# Patient Record
Sex: MALE | Race: WHITE | NOT HISPANIC OR LATINO | Employment: FULL TIME | URBAN - METROPOLITAN AREA
[De-identification: names, ages, dates, MRNs, and addresses within clinical notes are randomized per-mention and may not be internally consistent; named-entity substitution may affect disease eponyms.]

---

## 2017-10-09 ENCOUNTER — APPOINTMENT (OUTPATIENT)
Dept: RADIOLOGY | Age: 32
End: 2017-10-09
Payer: OTHER MISCELLANEOUS

## 2017-10-09 ENCOUNTER — APPOINTMENT (OUTPATIENT)
Dept: URGENT CARE | Age: 32
End: 2017-10-09
Payer: OTHER MISCELLANEOUS

## 2017-10-09 ENCOUNTER — TRANSCRIBE ORDERS (OUTPATIENT)
Dept: URGENT CARE | Age: 32
End: 2017-10-09

## 2017-10-09 DIAGNOSIS — S49.91XA INJURY OF RIGHT SHOULDER, INITIAL ENCOUNTER: Primary | ICD-10-CM

## 2017-10-09 DIAGNOSIS — S49.91XA INJURY OF RIGHT SHOULDER, INITIAL ENCOUNTER: ICD-10-CM

## 2017-10-09 PROCEDURE — 99283 EMERGENCY DEPT VISIT LOW MDM: CPT | Performed by: PREVENTIVE MEDICINE

## 2017-10-09 PROCEDURE — 73030 X-RAY EXAM OF SHOULDER: CPT

## 2017-10-09 PROCEDURE — G0382 LEV 3 HOSP TYPE B ED VISIT: HCPCS | Performed by: PREVENTIVE MEDICINE

## 2017-10-16 ENCOUNTER — APPOINTMENT (OUTPATIENT)
Dept: URGENT CARE | Age: 32
End: 2017-10-16
Payer: OTHER MISCELLANEOUS

## 2017-10-16 PROCEDURE — 99212 OFFICE O/P EST SF 10 MIN: CPT | Performed by: PREVENTIVE MEDICINE

## 2017-10-26 ENCOUNTER — APPOINTMENT (OUTPATIENT)
Dept: PHYSICAL THERAPY | Facility: REHABILITATION | Age: 32
End: 2017-10-26
Payer: OTHER MISCELLANEOUS

## 2017-10-26 PROCEDURE — G8984 CARRY CURRENT STATUS: HCPCS

## 2017-10-26 PROCEDURE — G8985 CARRY GOAL STATUS: HCPCS

## 2017-10-26 PROCEDURE — 97161 PT EVAL LOW COMPLEX 20 MIN: CPT

## 2017-10-26 PROCEDURE — 97014 ELECTRIC STIMULATION THERAPY: CPT

## 2017-10-30 ENCOUNTER — APPOINTMENT (OUTPATIENT)
Dept: PHYSICAL THERAPY | Facility: REHABILITATION | Age: 32
End: 2017-10-30
Payer: OTHER MISCELLANEOUS

## 2017-10-30 PROCEDURE — 97140 MANUAL THERAPY 1/> REGIONS: CPT

## 2017-10-30 PROCEDURE — 97112 NEUROMUSCULAR REEDUCATION: CPT

## 2017-10-30 PROCEDURE — 97014 ELECTRIC STIMULATION THERAPY: CPT

## 2017-10-31 ENCOUNTER — APPOINTMENT (OUTPATIENT)
Dept: URGENT CARE | Age: 32
End: 2017-10-31
Payer: OTHER MISCELLANEOUS

## 2017-10-31 PROCEDURE — 99213 OFFICE O/P EST LOW 20 MIN: CPT | Performed by: PREVENTIVE MEDICINE

## 2017-11-02 ENCOUNTER — APPOINTMENT (OUTPATIENT)
Dept: PHYSICAL THERAPY | Facility: REHABILITATION | Age: 32
End: 2017-11-02
Payer: OTHER MISCELLANEOUS

## 2017-11-02 PROCEDURE — 97014 ELECTRIC STIMULATION THERAPY: CPT

## 2017-11-02 PROCEDURE — 97112 NEUROMUSCULAR REEDUCATION: CPT

## 2017-11-06 ENCOUNTER — APPOINTMENT (OUTPATIENT)
Dept: PHYSICAL THERAPY | Facility: REHABILITATION | Age: 32
End: 2017-11-06
Payer: OTHER MISCELLANEOUS

## 2017-11-06 PROCEDURE — 97014 ELECTRIC STIMULATION THERAPY: CPT

## 2017-11-09 ENCOUNTER — APPOINTMENT (OUTPATIENT)
Dept: PHYSICAL THERAPY | Facility: REHABILITATION | Age: 32
End: 2017-11-09
Payer: OTHER MISCELLANEOUS

## 2017-11-09 PROCEDURE — 97014 ELECTRIC STIMULATION THERAPY: CPT

## 2017-11-09 PROCEDURE — 97112 NEUROMUSCULAR REEDUCATION: CPT

## 2017-11-16 ENCOUNTER — APPOINTMENT (OUTPATIENT)
Dept: PHYSICAL THERAPY | Facility: REHABILITATION | Age: 32
End: 2017-11-16
Payer: OTHER MISCELLANEOUS

## 2019-08-26 ENCOUNTER — TELEPHONE (OUTPATIENT)
Dept: UROLOGY | Facility: MEDICAL CENTER | Age: 34
End: 2019-08-26

## 2019-08-27 ENCOUNTER — TELEPHONE (OUTPATIENT)
Dept: UROLOGY | Facility: CLINIC | Age: 34
End: 2019-08-27

## 2019-08-27 NOTE — TELEPHONE ENCOUNTER
L/m regarding r/s appointment, new location, doctor and date/time  Patient scheduled in error with cameron for vas consult

## 2019-09-16 ENCOUNTER — OFFICE VISIT (OUTPATIENT)
Dept: UROLOGY | Facility: AMBULATORY SURGERY CENTER | Age: 34
End: 2019-09-16
Payer: COMMERCIAL

## 2019-09-16 VITALS
HEART RATE: 64 BPM | SYSTOLIC BLOOD PRESSURE: 136 MMHG | WEIGHT: 209 LBS | HEIGHT: 69 IN | DIASTOLIC BLOOD PRESSURE: 90 MMHG | BODY MASS INDEX: 30.96 KG/M2

## 2019-09-16 DIAGNOSIS — Z30.2 ENCOUNTER FOR STERILIZATION: Primary | ICD-10-CM

## 2019-09-16 PROCEDURE — 99244 OFF/OP CNSLTJ NEW/EST MOD 40: CPT | Performed by: UROLOGY

## 2019-09-16 RX ORDER — ATENOLOL 25 MG/1
25 TABLET ORAL DAILY
COMMUNITY

## 2019-09-16 RX ORDER — LORAZEPAM 2 MG/1
2 TABLET ORAL
Qty: 1 TABLET | Refills: 0 | Status: SHIPPED | OUTPATIENT
Start: 2019-09-16

## 2019-09-16 RX ORDER — LISINOPRIL 10 MG/1
10 TABLET ORAL DAILY
COMMUNITY

## 2019-09-16 NOTE — PROGRESS NOTES
9/16/2019    Frida Serna  1985  [id number]        Assessment  Elective sterilization    Plan  We discussed our vasectomy packet in detail  He understands the indications, risks, and benefits of the procedure  He understands that this is generally considered a permanent procedure although there is a method for reversal, it is not guaranteed to work and would not be covered by insurance  He understands that he would not be considered sterile until negative semen analysis is obtained post procedure  He further understands that he must rest, ice, and elevate the scrotum for at least 48 hours to avoid complications such as hematoma  Consent was obtained in the office today  All of his questions were answered  He was given a prescription for Ativan to be taken prior to the visit  History of Present Illness  Serina Olivares is a 30 y o  male with 1 child and 3 stepchildren requesting elective sterilization  He works as a    He understands that vasectomy is a permanent form of birth control  Review of Systems  Review of Systems   Constitutional: Negative  HENT: Negative  Respiratory: Negative  Cardiovascular: Negative  Gastrointestinal: Negative  Genitourinary:        As per HPI   Musculoskeletal: Negative  Skin: Negative  Neurological: Negative  Hematological: Negative            Past Medical History  Past Medical History:   Diagnosis Date    Hypertension        Past Social History  Past Surgical History:   Procedure Laterality Date    TONSILLECTOMY         Past Family History  Family History   Problem Relation Age of Onset    Heart attack Father     Kidney disease Mother        Past Social history  Social History     Socioeconomic History    Marital status: Single     Spouse name: Not on file    Number of children: Not on file    Years of education: Not on file    Highest education level: Not on file   Occupational History    Not on file   Social Needs    Financial resource strain: Not on file    Food insecurity:     Worry: Not on file     Inability: Not on file    Transportation needs:     Medical: Not on file     Non-medical: Not on file   Tobacco Use    Smoking status: Current Every Day Smoker     Types: Cigarettes    Smokeless tobacco: Never Used    Tobacco comment: smoked 3 months 4 years ago   Substance and Sexual Activity    Alcohol use: Yes     Frequency: Monthly or less    Drug use: No     Comment: Quit 4 years ago    Sexual activity: Not on file   Lifestyle    Physical activity:     Days per week: Not on file     Minutes per session: Not on file    Stress: Not on file   Relationships    Social connections:     Talks on phone: Not on file     Gets together: Not on file     Attends [employer] service: Not on file     Active member of club or organization: Not on file     Attends meetings of clubs or organizations: Not on file     Relationship status: Not on file    Intimate partner violence:     Fear of current or ex partner: Not on file     Emotionally abused: Not on file     Physically abused: Not on file     Forced sexual activity: Not on file   Other Topics Concern    Not on file   Social History Narrative    Not on file     Social History     Tobacco Use   Smoking Status Current Every Day Smoker    Types: Cigarettes   Smokeless Tobacco Never Used   Tobacco Comment    smoked 3 months 4 years ago       Current Medications  Current Outpatient Medications   Medication Sig Dispense Refill    atenolol (TENORMIN) 25 mg tablet Take 25 mg by mouth daily      lisinopril (ZESTRIL) 10 mg tablet Take 10 mg by mouth daily      LORazepam (ATIVAN) 2 mg tablet Take 1 tablet (2 mg total) by mouth 60 minutes pre-procedure 1 tablet 0     No current facility-administered medications for this visit  Allergies  No Known Allergies    Past Medical History, Social History, Family History, medications and allergies were reviewed      Vitals  Vitals: 09/16/19 1116   BP: 136/90   BP Location: Left arm   Patient Position: Sitting   Cuff Size: Standard   Pulse: 64   Weight: 94 8 kg (209 lb)   Height: 5' 9" (1 753 m)       Physical Exam  Physical Exam   Constitutional: He is oriented to person, place, and time  He appears well-developed and well-nourished  Cardiovascular: Normal rate  Pulmonary/Chest: Effort normal    Abdominal: Soft  Genitourinary:   Genitourinary Comments: Normal Vasa and testes  Musculoskeletal: Normal range of motion  Neurological: He is alert and oriented to person, place, and time  Skin: Skin is warm, dry and intact  Psychiatric: He has a normal mood and affect  Vitals reviewed          Results  No results found for: PSA  No results found for: GLUCOSE, CALCIUM, NA, K, CO2, CL, BUN, CREATININE  No results found for: WBC, HGB, HCT, MCV, PLT

## 2019-11-19 RX ORDER — MONTELUKAST SODIUM 10 MG/1
10 TABLET ORAL
COMMUNITY
Start: 2019-11-11

## 2019-11-22 ENCOUNTER — PROCEDURE VISIT (OUTPATIENT)
Dept: UROLOGY | Facility: AMBULATORY SURGERY CENTER | Age: 34
End: 2019-11-22
Payer: COMMERCIAL

## 2019-11-22 VITALS
BODY MASS INDEX: 30.81 KG/M2 | SYSTOLIC BLOOD PRESSURE: 122 MMHG | HEART RATE: 90 BPM | DIASTOLIC BLOOD PRESSURE: 80 MMHG | HEIGHT: 69 IN | WEIGHT: 208 LBS

## 2019-11-22 DIAGNOSIS — Z30.2 ENCOUNTER FOR STERILIZATION: Primary | ICD-10-CM

## 2019-11-22 PROCEDURE — 55250 REMOVAL OF SPERM DUCT(S): CPT | Performed by: UROLOGY

## 2019-11-22 PROCEDURE — 88302 TISSUE EXAM BY PATHOLOGIST: CPT | Performed by: PATHOLOGY

## 2019-11-22 NOTE — PROGRESS NOTES
Procedure: Vasectomy   Risks, benefits and alternatives were discussed with the patient  We discussed possible complications, including infection and bleeding  The patient was premedicated with lorazepam    The genitalia were prepped with Betadine  Sterile drape was placed  The scrotum was anesthetized with of lidocaine 2%  The skin was opened with the sharp clamp and the right vas was grasped with the ring clamp  The perivasal sheath and vessels were then dissected off bluntly and the vas was doubly clamped  A portion was excised and both ends were then fulgurated and tied with 0 silk  Seeing good hemostasis, they were returned to the scrotum and the skin was closed with a chromic stich  The skin was opened with the sharp clamp and the left vas was grasped with the ring clamp  The perivasal sheath and vessels were then dissected off bluntly and the vas was doubly clamped  A portion was excised and both ends were then fulgurated and tied with 0 silk  Seeing good hemostasis, they were returned to the scrotum and the skin was closed with a chromic stich  Antibiotic ointment was applied  The patient tolerated the procedure well  there were no complications  He was given a prescription for Norco postprocedure  He understands he is not to be considered sterile until 2 consecutive negative semen analyses are obtained postprocedure  He also understands he should rest, ice and elevate the scrotum for atleast 48 hours to avoid complication such as hematoma

## 2019-12-12 ENCOUNTER — TELEPHONE (OUTPATIENT)
Dept: UROLOGY | Facility: MEDICAL CENTER | Age: 34
End: 2019-12-12

## 2019-12-12 NOTE — TELEPHONE ENCOUNTER
Pt managed by Alonzo Vidales called regarding his 2 week post vas appointment which has been bumped to 12/13/19 which he can not do as he works a third shift and has children to get ready for school,he is only available Tues & Thurs early morning before 9am,I was unable to accommodate

## 2019-12-12 NOTE — TELEPHONE ENCOUNTER
Called patient and rescheduled to Tuesday 12/17 1:45pm, he said that he was able to accommodate an early afternoon appointment prior to the start of his shift and was happy with this date and time  Email confirmation sent to [email or web address] at his request

## 2019-12-16 NOTE — PROGRESS NOTES
12/17/2019  Jazmine Vega is a 30 y o  male    Assessment/Plan  Baldev Escobar was seen today for s/p vastectomy  Diagnoses and all orders for this visit:    Status post vasectomy  -     Semen analysis, post-vasectomy; Future  -     Semen analysis, post-vasectomy; Future        Discussion  Jazmine Vega is a 30 y o  male being managed by Dr Anirudh Vasquez  The patient is doing well with no complaints  He was provided verbal and written instructions regarding semen analysis testing  He was instructed to use contraception until sterility confirmed with 2 semen analysis  He will call to review results  He will follow up on an as needed basis  All questions were answered  History of Present Illness  30 y o  male s/p vasectomy (11/22/19), presents today for follow up  He denies any scrotal pain or swelling  He is doing well with no issues after surgery  Vitals  Vitals:    12/17/19 1345   BP: 120/90   Pulse: 88   Weight: 96 1 kg (211 lb 12 8 oz)   Height: 5' 10" (1 778 m)       Current Medications  Current Outpatient Medications   Medication Sig Dispense Refill    atenolol (TENORMIN) 25 mg tablet Take 25 mg by mouth daily      lisinopril (ZESTRIL) 10 mg tablet Take 10 mg by mouth daily      montelukast (SINGULAIR) 10 mg tablet Take 10 mg by mouth      LORazepam (ATIVAN) 2 mg tablet Take 1 tablet (2 mg total) by mouth 60 minutes pre-procedure 1 tablet 0     No current facility-administered medications for this visit  Review of Systems  Patient denies any gross hematuria, dysuria, or difficulty urinating      Physical Exam  Gu: scrotum midline/bilateral incisions c/d/i

## 2019-12-17 ENCOUNTER — OFFICE VISIT (OUTPATIENT)
Dept: UROLOGY | Facility: AMBULATORY SURGERY CENTER | Age: 34
End: 2019-12-17

## 2019-12-17 VITALS
SYSTOLIC BLOOD PRESSURE: 120 MMHG | HEIGHT: 70 IN | BODY MASS INDEX: 30.32 KG/M2 | DIASTOLIC BLOOD PRESSURE: 90 MMHG | HEART RATE: 88 BPM | WEIGHT: 211.8 LBS

## 2019-12-17 DIAGNOSIS — Z98.52 STATUS POST VASECTOMY: Primary | ICD-10-CM

## 2019-12-17 PROCEDURE — 99024 POSTOP FOLLOW-UP VISIT: CPT | Performed by: NURSE PRACTITIONER

## 2020-07-31 ENCOUNTER — TELEPHONE (OUTPATIENT)
Dept: UROLOGY | Facility: AMBULATORY SURGERY CENTER | Age: 35
End: 2020-07-31

## 2020-08-19 ENCOUNTER — APPOINTMENT (OUTPATIENT)
Dept: RADIOLOGY | Age: 35
End: 2020-08-19

## 2020-08-19 ENCOUNTER — APPOINTMENT (OUTPATIENT)
Dept: LAB | Age: 35
End: 2020-08-19

## 2020-08-19 ENCOUNTER — TRANSCRIBE ORDERS (OUTPATIENT)
Dept: ADMINISTRATIVE | Age: 35
End: 2020-08-19

## 2020-08-19 DIAGNOSIS — Z02.1 ENCOUNTER FOR PRE-EMPLOYMENT HEALTH SCREENING EXAMINATION: Primary | ICD-10-CM

## 2020-08-19 DIAGNOSIS — Z02.1 ENCOUNTER FOR PRE-EMPLOYMENT HEALTH SCREENING EXAMINATION: ICD-10-CM

## 2020-08-19 PROCEDURE — 71045 X-RAY EXAM CHEST 1 VIEW: CPT

## 2021-03-10 DIAGNOSIS — Z23 ENCOUNTER FOR IMMUNIZATION: ICD-10-CM

## 2021-06-18 NOTE — PROGRESS NOTES
6/21/2021    Marcinmagdalena Johnson  1985  [id number]      Assessment  -History of vasectomy (11/2019)    Discussion/Plan  Zenaida Elias is a 37 y o  male being managed by Dr Windy Hauser      1  History of testicular pain-   No significant findings noted on physical examination today, and patient currently asymptomatic  We reviewed supportive measures with use of scrotal support, warm compress, and OTC NSAIDs  Cause likely musculskeletal as discomfort is correlated with heavy lifting  Order for scrotal ultrasound was placed should he develop an episode of acute onset testicular pain  Will call with results  Patient will otherwise follow up with our office on as-needed basis  He was instructed to call with any issues     -All questions answered, patient agrees with plan      History of Present Illness  37 y o  male with a history of vasectomy and swollen testicle presents today for follow up  Patient last seen in the office in 2019  He underwent elective sterilization in November 2019  Patient reports history of epididymitis 1 year ago  He states he was prescribed antibiotic by his PCP  Since then, he has had intermittent right-sided testicular discomfort  Most recent episode occurred in May  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  He is currently asymptomatic denies any testicular pain or swelling  He denies any lower urinary tract symptoms, gross hematuria, or dysuria  Patient denies any family history of prostate or testicular malignancy  Review of Systems  Review of Systems   Constitutional: Negative  HENT: Negative  Respiratory: Negative  Cardiovascular: Negative  Gastrointestinal: Negative  Genitourinary: Negative for decreased urine volume, difficulty urinating, discharge, dysuria, flank pain, frequency, hematuria, penile pain, penile swelling, scrotal swelling, testicular pain and urgency  Musculoskeletal: Negative  Skin: Negative  Neurological: Negative  Psychiatric/Behavioral: Negative  Past Medical History  Past Medical History:   Diagnosis Date    Hypertension        Past Social History  Past Surgical History:   Procedure Laterality Date    TONSILLECTOMY         Past Family History  Family History   Problem Relation Age of Onset    Heart attack Father     Kidney disease Mother        Past Social history  Social History     Socioeconomic History    Marital status: Single     Spouse name: Not on file    Number of children: Not on file    Years of education: Not on file    Highest education level: Not on file   Occupational History    Not on file   Tobacco Use    Smoking status: Former Smoker     Types: Cigarettes     Quit date:      Years since quittin 4    Smokeless tobacco: Never Used    Tobacco comment: smoked 3 months 4 years ago   Substance and Sexual Activity    Alcohol use: Yes    Drug use: No     Comment: Quit 4 years ago    Sexual activity: Not on file   Other Topics Concern    Not on file   Social History Narrative    Not on file     Social Determinants of Health     Financial Resource Strain:     Difficulty of Paying Living Expenses:    Food Insecurity:     Worried About Running Out of Food in the Last Year:     [address] in the Last Year:    Transportation Needs:     Lack of Transportation (Medical):      Lack of Transportation (Non-Medical):    Physical Activity:     Days of Exercise per Week:     Minutes of Exercise per Session:    Stress:     Feeling of Stress :    Social Connections:     Frequency of Communication with Friends and Family:     Frequency of Social Gatherings with Friends and Family:     Attends [employer] Services:     Active Member of Clubs or Organizations:     Attends Club or Organization Meetings:     Marital Status:    Intimate Partner Violence:     Fear of Current or Ex-Partner:     Emotionally Abused:     Physically Abused:     Sexually Abused: Current Medications  Current Outpatient Medications   Medication Sig Dispense Refill    atenolol (TENORMIN) 25 mg tablet Take 25 mg by mouth daily      lisinopril (ZESTRIL) 10 mg tablet Take 10 mg by mouth daily      LORazepam (ATIVAN) 2 mg tablet Take 1 tablet (2 mg total) by mouth 60 minutes pre-procedure 1 tablet 0    montelukast (SINGULAIR) 10 mg tablet Take 10 mg by mouth       No current facility-administered medications for this visit  Allergies  No Known Allergies    Past Medical History, Social History, Family History, medications and allergies were reviewed  Vitals  Vitals:    06/21/21 1134   BP: 142/88   BP Location: Left arm   Patient Position: Sitting   Cuff Size: Adult   Pulse: 91   Weight: 90 7 kg (200 lb)   Height: 5' 9" (1 753 m)       Physical Exam  Physical Exam  Constitutional:       Appearance: Normal appearance  He is well-developed  HENT:      Head: Normocephalic  Eyes:      Pupils: Pupils are equal, round, and reactive to light  Pulmonary:      Effort: Pulmonary effort is normal    Abdominal:      Palpations: Abdomen is soft  Genitourinary:     Penis: Normal        Testes: Normal       Comments: Testicles descended bilaterally, no erythema, edema, or tenderness  Penis circumcised, no inguinal hernia noted  Musculoskeletal:         General: Normal range of motion  Cervical back: Normal range of motion  Skin:     General: Skin is warm and dry  Neurological:      General: No focal deficit present  Mental Status: He is alert and oriented to person, place, and time  Psychiatric:         Mood and Affect: Mood normal          Behavior: Behavior normal          Thought Content:  Thought content normal          Judgment: Judgment normal          Results    I have personally reviewed all pertinent lab results and reviewed with patient  No results found for: PSA  No results found for: GLUCOSE, CALCIUM, NA, K, CO2, CL, BUN, CREATININE  No results found for: WBC, HGB, HCT, MCV, PLT  No results found for this or any previous visit (from the past 1 hour(s))

## 2021-06-21 ENCOUNTER — OFFICE VISIT (OUTPATIENT)
Dept: UROLOGY | Facility: AMBULATORY SURGERY CENTER | Age: 36
End: 2021-06-21
Payer: COMMERCIAL

## 2021-06-21 VITALS
DIASTOLIC BLOOD PRESSURE: 88 MMHG | HEART RATE: 91 BPM | BODY MASS INDEX: 29.62 KG/M2 | HEIGHT: 69 IN | WEIGHT: 200 LBS | SYSTOLIC BLOOD PRESSURE: 142 MMHG

## 2021-06-21 DIAGNOSIS — Z98.52 HISTORY OF VASECTOMY: ICD-10-CM

## 2021-06-21 DIAGNOSIS — N50.811 PAIN IN RIGHT TESTICLE: Primary | ICD-10-CM

## 2021-06-21 PROCEDURE — 99213 OFFICE O/P EST LOW 20 MIN: CPT | Performed by: NURSE PRACTITIONER

## 2022-10-24 ENCOUNTER — HOSPITAL ENCOUNTER (OUTPATIENT)
Dept: RADIOLOGY | Facility: IMAGING CENTER | Age: 37
Discharge: HOME | End: 2022-10-24
Payer: COMMERCIAL

## 2022-10-24 DIAGNOSIS — N45.1 EPIDIDYMITIS WITHOUT ABSCESS: ICD-10-CM

## 2022-10-24 PROCEDURE — 76870 US EXAM SCROTUM: CPT

## 2024-10-25 ENCOUNTER — TRANSCRIBE ORDERS (OUTPATIENT)
Dept: SLEEP CENTER | Facility: CLINIC | Age: 39
End: 2024-10-25

## 2024-10-25 DIAGNOSIS — G47.33 OSA (OBSTRUCTIVE SLEEP APNEA): Primary | ICD-10-CM

## 2024-12-13 ENCOUNTER — HOSPITAL ENCOUNTER (OUTPATIENT)
Dept: SLEEP CENTER | Facility: HOSPITAL | Age: 39
Discharge: HOME | End: 2024-12-13
Payer: COMMERCIAL

## 2024-12-13 DIAGNOSIS — G47.33 OSA (OBSTRUCTIVE SLEEP APNEA): ICD-10-CM

## 2024-12-13 PROCEDURE — G0399 HOME SLEEP TEST/TYPE 3 PORTA: HCPCS

## 2024-12-13 NOTE — PROGRESS NOTES
Home Sleep Study Documentation    HOME STUDY DEVICE: Noxturnal no                                           Jada G3 yes device # 28      Pre-Sleep Home Study:    Set-up and instructions performed by: Amanda    Technician performed demonstration for Patient: yes    Return demonstration performed by Patient: yes    Written instructions provided to Patient: yes    Patient signed consent form: yes        Post-Sleep Home Study:    Additional comments by Patient: None    Home Sleep Study Failed:no:    Failure reason: N/A    Reported or Detected: N/A    Scored by: DEVANTE Egan

## 2024-12-20 PROBLEM — G47.33 OSA (OBSTRUCTIVE SLEEP APNEA): Status: ACTIVE | Noted: 2024-12-20

## 2024-12-27 PROCEDURE — 95806 SLEEP STUDY UNATT&RESP EFFT: CPT | Performed by: INTERNAL MEDICINE

## 2025-01-03 ENCOUNTER — TELEPHONE (OUTPATIENT)
Dept: SLEEP CENTER | Facility: CLINIC | Age: 40
End: 2025-01-03

## 2025-01-03 NOTE — TELEPHONE ENCOUNTER
Home sleep study resulted and shows severe sleep apnea, CARLOS 30.3  Recommendations per Dr. Rodriguez:  Limitations of home sleep testing compromises accuracy. Based on the results of this study, positive airway pressure therapy using auto titrating PAP is recommended. Clinical correlation is advised.     Patient read results.     Call to patient reviewed results with patient and recommendations.     Patient states he was in to see Dr. Valdez, no note found.  States he put in order for CPAP titration study.  Not found in chart. Patient instructed the referral for the study might not have been transcribed into an order yet. Patient instructed if he does not get a [employer] message by Tuesday 1/7/25 to contact Dr. Liao office.     Patient scheduled for consult with TAMARA Bonilla on 3/21/2025 in the [place] office.

## 2025-02-13 ENCOUNTER — OFFICE VISIT (OUTPATIENT)
Dept: SLEEP CENTER | Facility: CLINIC | Age: 40
End: 2025-02-13
Payer: COMMERCIAL

## 2025-02-13 VITALS
BODY MASS INDEX: 31.55 KG/M2 | DIASTOLIC BLOOD PRESSURE: 80 MMHG | HEART RATE: 91 BPM | OXYGEN SATURATION: 98 % | HEIGHT: 69 IN | RESPIRATION RATE: 16 BRPM | WEIGHT: 213 LBS | SYSTOLIC BLOOD PRESSURE: 110 MMHG

## 2025-02-13 DIAGNOSIS — F31.9 BIPOLAR 1 DISORDER (HCC): ICD-10-CM

## 2025-02-13 DIAGNOSIS — E66.09 CLASS 1 OBESITY DUE TO EXCESS CALORIES WITH BODY MASS INDEX (BMI) OF 31.0 TO 31.9 IN ADULT, UNSPECIFIED WHETHER SERIOUS COMORBIDITY PRESENT: ICD-10-CM

## 2025-02-13 DIAGNOSIS — G47.33 OSA (OBSTRUCTIVE SLEEP APNEA): Primary | ICD-10-CM

## 2025-02-13 DIAGNOSIS — E66.811 CLASS 1 OBESITY DUE TO EXCESS CALORIES WITH BODY MASS INDEX (BMI) OF 31.0 TO 31.9 IN ADULT, UNSPECIFIED WHETHER SERIOUS COMORBIDITY PRESENT: ICD-10-CM

## 2025-02-13 DIAGNOSIS — I10 HYPERTENSION, ESSENTIAL: ICD-10-CM

## 2025-02-13 PROCEDURE — 99245 OFF/OP CONSLTJ NEW/EST HI 55: CPT | Performed by: NURSE PRACTITIONER

## 2025-02-13 RX ORDER — DEXTROMETHORPHAN HYDROBROMIDE, BUPROPION HYDROCHLORIDE 105; 45 MG/1; MG/1
1 TABLET, MULTILAYER, EXTENDED RELEASE ORAL 2 TIMES DAILY
COMMUNITY

## 2025-02-13 RX ORDER — ROSUVASTATIN CALCIUM 10 MG/1
10 TABLET, COATED ORAL DAILY
COMMUNITY
Start: 2024-11-20 | End: 2025-11-20

## 2025-02-13 RX ORDER — ATOMOXETINE 80 MG/1
80 CAPSULE ORAL
COMMUNITY
Start: 2024-09-12

## 2025-02-13 RX ORDER — CARIPRAZINE 4.5 MG/1
1 CAPSULE, GELATIN COATED ORAL DAILY
COMMUNITY
Start: 2025-01-08

## 2025-02-13 RX ORDER — TADALAFIL 5 MG/1
TABLET ORAL
COMMUNITY
Start: 2025-02-11

## 2025-02-13 RX ORDER — NALTREXONE HYDROCHLORIDE 50 MG/1
TABLET, FILM COATED ORAL
COMMUNITY
Start: 2025-02-11

## 2025-02-13 RX ORDER — LAMOTRIGINE 100 MG/1
TABLET ORAL
COMMUNITY
Start: 2025-02-11

## 2025-02-13 RX ORDER — TRAZODONE HYDROCHLORIDE 50 MG/1
TABLET, FILM COATED ORAL
COMMUNITY
Start: 2025-02-11

## 2025-02-13 RX ORDER — LISINOPRIL 20 MG/1
1 TABLET ORAL DAILY
COMMUNITY
Start: 2025-01-17

## 2025-02-13 RX ORDER — OMEPRAZOLE 40 MG/1
40 CAPSULE, DELAYED RELEASE ORAL DAILY
COMMUNITY
Start: 2024-09-24 | End: 2025-09-24

## 2025-02-13 NOTE — ASSESSMENT & PLAN NOTE
We reviewed the association between sleep, mood, and coexisting psychiatric disorders. We discussed the importance of obtaining adequate sleep of at least 7 hours nightly. Patient was encouraged to continue current prescribed medications and to continue follow up with their PCP/psychiatrist for further management.     Orders:    CPAP Auto New DME

## 2025-02-13 NOTE — ASSESSMENT & PLAN NOTE
Hypertension - We reviewed the association between untreated obstructive sleep apnea and the increased risk for hypertension. Patient to continue on prescribed anti-hypertensive therapy (tenormin, lisinopril) and follow up with PCP for continuity of care.     Orders:    CPAP Auto New DME

## 2025-02-13 NOTE — ASSESSMENT & PLAN NOTE
I reviewed the recent test results with the patient.  We talked about the abnormal findings, including those consistent with Obstructive Sleep Apnea. We then discussed how the these are categorized as mild, moderate or severe.  We also covered the medical comorbidities associated with untreated Obstructive Sleep Apnea including, hypertension, cardiac arrythmia, myocardial infarction and stroke.  I explained how the risk of these conditions increases with the severity of the test results.  I also spoke in some detail about the most common treatment options including weight loss, nasal PAP, mandibular advancement devices and uvulopalatopharyngoplasty (UPPP).  I answered all questions posed to me and gave my opinion regarding the best options for treatment based on the patient and their level of disease.  In this case he chose to begin treatment using CPAP.    The patient will return 31-91 days after beginning use of PAP therapy for a review of compliance data collected after beginning treatment.  We will discuss this data and talk about any problems that may prevent successful treatment of Obstructive Sleep Apnea.  Changes will be made in treatment parameters or interface devices in order to improve his ability to continue using PAP to maintain upper airway patency during sleep.  Orders:    CPAP Auto New DME

## 2025-02-13 NOTE — PATIENT INSTRUCTIONS
Patient Instructions:    1. Schedule an appointment for set up of PAP equipment  2.  Begin using your equipment every night  3.  Begin cleaning your equipment, as recommended   4.  Contact your medical equipment distributor regarding any questions concerning your PAP equipment  5.  Contact the [facility] with any other questions, prior to next visit, if needed  6.  Schedule compliance follow-up visit in 31-91 days, as required by insurance     Thank you for trusting me with your care!    I know we often  cover a lot of information at the visit, so if you have follow-up questions, are unclear about the plan, or feel there were important items that we did not discuss or you did not receive clarity on, please don't hesitate to reach out to me.     [employer] messages are preferred for routine matters.  Please make sure to call for urgent matters as there can be a delay in responding to questions over [employer].    IMPORTANT- Prior to a sleep study (at home or in the sleep lab), I strongly recommend contacting your medical insurance first to understand your benefits (including deductible if applicable), coverage for this test, and out of pocket costs.  Even if the test is approved by medical insurance, the cost to you is determined by your medical benefits.   If you have concerns about your out of pocket costs for sleep testing, please contact me/the office before you complete the test and we can discuss if there are alternate options.     I recommend following this advice in general before any lab test, imaging test, doctor visit, surgery, or ordering CPAP supplies as it is best to understand your coverage to avoid unexpected bills after the fact.       Nursing Support:  When: Monday through Friday 7:30A-4:30PM except holidays  Where: Our direct line is [phone]  *3  *1.      If you are having a true emergency please call 911.  In the event that the line is busy or it is after hours please leave a voice  message and we will return your call.  Please speak clearly, leaving your full name, birth date, best number to reach you and the reason for your call.   Medication refills: We will need the name of the medication, the dosage, the ordering provider, whether you get a 30 or 90 day refill, and the pharmacy name and address.  Medications will be ordered by the provider only.  Nurses cannot call in prescriptions.  Please allow 7 days for medication refills.  Physician requested updates: If your provider requested that you call with an update after starting medication, please be ready to provide us the medication and dosage, what time you take your medication, the time you attempt to fall asleep, time you fall asleep, when you wake up, and what time you get out of bed.  Sleep Study Results: We will contact you with sleep study results and/or next steps after the physician has reviewed your testing.

## 2025-02-13 NOTE — PROGRESS NOTES
Name: Josh Mars      : 1985      MRN: [id number]  Encounter Provider: TAMARA Bonilla  Encounter Date: 2025   Encounter department: [facility] [place]  :  Assessment & Plan  MILENA (obstructive sleep apnea)  I reviewed the recent test results with the patient.  We talked about the abnormal findings, including those consistent with Obstructive Sleep Apnea. We then discussed how the these are categorized as mild, moderate or severe.  We also covered the medical comorbidities associated with untreated Obstructive Sleep Apnea including, hypertension, cardiac arrythmia, myocardial infarction and stroke.  I explained how the risk of these conditions increases with the severity of the test results.  I also spoke in some detail about the most common treatment options including weight loss, nasal PAP, mandibular advancement devices and uvulopalatopharyngoplasty (UPPP).  I answered all questions posed to me and gave my opinion regarding the best options for treatment based on the patient and their level of disease.  In this case he chose to begin treatment using CPAP.    The patient will return 31-91 days after beginning use of PAP therapy for a review of compliance data collected after beginning treatment.  We will discuss this data and talk about any problems that may prevent successful treatment of Obstructive Sleep Apnea.  Changes will be made in treatment parameters or interface devices in order to improve his ability to continue using PAP to maintain upper airway patency during sleep.  Orders:    CPAP Auto New DME    Hypertension, essential  Hypertension - We reviewed the association between untreated obstructive sleep apnea and the increased risk for hypertension. Patient to continue on prescribed anti-hypertensive therapy (tenormin, lisinopril) and follow up with PCP for continuity of care.     Orders:    CPAP Auto New DME    Bipolar 1 disorder (HCC)  We reviewed the association  between sleep, mood, and coexisting psychiatric disorders. We discussed the importance of obtaining adequate sleep of at least 7 hours nightly. Patient was encouraged to continue current prescribed medications and to continue follow up with their PCP/psychiatrist for further management.     Orders:    CPAP Auto New DME    Class 1 obesity due to excess calories with body mass index (BMI) of 31.0 to 31.9 in adult, unspecified whether serious comorbidity present  Obesity - We reviewed the association of obesity on obstructive sleep apnea and sleep disordered breathing. Encouraged patient to follow healthy diet, regular exercise regimen, and pursue weight loss to manage symptoms.            History of Present Illness   Patient is a 39 year old male with PMHx of obesity, HTN, bipolar disorder, history of substance abuse, who presents for a consultation to establish care for severe obstructive sleep apnea.  His wife reports loud snoring, gasping and witnessed periods of apnea.  He discussed excessive daytime sleepiness with his PCP and a home sleep study was ordered and completed in December 2024.  During the study, he had a total of 267 respiratory events made up of 194 obstructive apneas, 0 central apneas, 1 mixed apneas and 72 hypopneas resulting in a respiratory event index (CARLOS) of 30.3/h.  The index was higher while supine.  The lowest SpO2 recorded is 85% and 11.4 minutes during the study was spent with saturations <90%.          Sitting and reading: Moderate chance of dozing  Watching TV: High chance of dozing  Sitting, inactive in a public place (e.g. a theatre or a meeting): Slight chance of dozing  As a passenger in a car for an hour without a break: High chance of dozing  Lying down to rest in the afternoon when circumstances permit: High chance of dozing  Sitting and talking to someone: Would never doze  Sitting quietly after a lunch without alcohol: High chance of dozing  In a car, while stopped for a few  "minutes in traffic: Would never doze  Total score: 15     Review of Systems  Pertinent positives/negatives included in HPI and also as noted: recent neck surgery with artificial disc insertion      Prior Sleep Studies:  He completed a home sleep study in December 2024.  CARLOS was 30.3. Oxygen jose luis was  85% with 11.4 minutes spent at saturations less than 90%.        Past Treatments:  none    Sleep-Wake Schedule:  He is self-described as a night person.  Bedtime: 10:00pm  Wake Time: 4:30am  Difficulty Falling Asleep: No  Avg Number of Awakenings: 3 times per night  Cause of Awakenings: dreams - vivid \"crazy\" dreams, occasionally wakes up for urination (took cialis for prostate enlargement and frequent nocturia)  Weekend Sleep Schedule: goes to bed around 11:30pm and wakes up around 11:30am  Naps: almost daily, 1-2 hours after work, before dinner    If He does take a nap, naps are not refreshing in quality    Avg TST per 24 hours: 5-6 hours and up to 10 on weekends    Sleep-Related Details:  Preferred Sleep Position: side(s)  SDB Symptoms: snoring, witnessed apneas, gasping/choking, multiple awakenings, dry mouth/nose, and waking unrefreshed  Bruxism: Yes, does not use an oral appliance  Nocturnal GERD: Yes, frequently - recently started  Nocturnal Palpitations: Yes, occasionally when going to sleep  Nocturnal Anxiety or Rumination: Yes, sometimes has anxiety keeping him from sleep - situational  Sleep-Related Hallucinations: Yes, sees intruder or figure and has sleep paralysis    Sleep Paralysis: Yes, began 10 years ago, occurs when sleep routine is disrupted    Cataplexy: No   He has intermittent symptoms consistent with restless legs syndrome.    He talks in his sleep at times.  No sleep walking.    Wake-Related Details  Daytime Sleepiness: Yes, [place] 15/24    Drowsy Driving: No  Employment:  currently working full time.  He works M-F between the hours of 6:00-2:00pm.    CDL license:  No    Caffeine:  one energy " "drink daily in the am, no other caffeine    Tobacco:   reports that he quit smoking about 10 years ago. His smoking use included cigarettes. He has never used smokeless tobacco.  E-cig/Vaping:    E-Cigarette/Vaping      E-Cigarette/Vaping Substances      Alcohol:   reports current alcohol use. 1-2 beers per week   Drugs:   reports no history of drug use. - past history of use of heroin, cocaine, meth, acid daily with last use in 2016      Weight Change:   weight has been relatively stable, 25 lb weight gain with medication    He does have difficulty with memory or concentration.         Family History of Sleep Disorders: father uses CPAP        Current Outpatient Medications on File Prior to Visit   Medication Sig Dispense Refill    atenolol (TENORMIN) 25 mg tablet Take 25 mg by mouth daily      atomoxetine (STRATTERA) 80 MG capsule Take 80 mg by mouth      lamoTRIgine (LaMICtal) 100 mg tablet       lisinopril (ZESTRIL) 20 mg tablet Take 1 tablet by mouth in the morning      montelukast (SINGULAIR) 10 mg tablet Take 10 mg by mouth      naltrexone (REVIA) 50 mg tablet       omeprazole (PriLOSEC) 40 MG capsule Take 40 mg by mouth daily      rosuvastatin (CRESTOR) 10 MG tablet Take 10 mg by mouth daily      tadalafil (CIALIS) 5 MG tablet       traZODone (DESYREL) 50 mg tablet       Vraylar 4.5 MG capsule Take 1 capsule by mouth daily      [DISCONTINUED] lisinopril (ZESTRIL) 10 mg tablet Take 10 mg by mouth daily      [DISCONTINUED] LORazepam (ATIVAN) 2 mg tablet Take 1 tablet (2 mg total) by mouth 60 minutes pre-procedure 1 tablet 0    Auvelity  MG TBCR Take 1 tablet by mouth 2 (two) times a day       No current facility-administered medications on file prior to visit.      Objective   /80   Pulse 91   Resp 16   Ht 5' 9\" (1.753 m)   Wt 96.6 kg (213 lb)   SpO2 98%   BMI 31.45 kg/m²     Neck Circumference: 16  Physical Exam    Constitutional: Alert, cooperative, no distress, appears stated age  Skin: " "Warm, dry, no rashes noted  Eyes: Conjunctiva/corneas clear  ENT: Nasal congestion absent, nares normal, septum midline, mucosa normal  Posterior Airspace:   [place] Tongue Position: 3  Retrognathia: absent  Overbite: absent  High Arched Palate: absent  Tongue Scalloping/Ridging: absent  Tonsils: absent   Uvula: normal  Lungs: Clear to auscultation bilaterally, respirations unlabored  Heart: normal rate and regular rhythm, S1/2 normal, no murmur noted, no rub or gallop  Extremities: Normal, no digital clubbing, no pedal edema  Neuro: No focal deficits noted    Data  No results found for: \"HGB\", \"HCT\", \"MCV\"   Lab Results   Component Value Date    CALCIUM 9.9 01/16/2025    K 4 01/16/2025    CO2 30 01/16/2025    CL 99 (L) 01/16/2025    BUN 17 01/16/2025    CREATININE 1.12 01/16/2025     Lab Results   Component Value Date    IRON 103 06/11/2020    FERRITIN 161 06/11/2020     Lab Results   Component Value Date    AST 20 10/05/2024    ALT 43 10/05/2024       Administrative Statements   I have spent a total time of 55 minutes in caring for this patient on the day of the visit/encounter including Risks and benefits of tx options, Instructions for management, Patient and family education, Importance of tx compliance, Risk factor reductions, Impressions, Counseling / Coordination of care, Documenting in the medical record, Reviewing / ordering tests, medicine, procedures  , and Obtaining or reviewing history  .   "

## 2025-02-14 ENCOUNTER — TELEPHONE (OUTPATIENT)
Dept: SLEEP CENTER | Facility: CLINIC | Age: 40
End: 2025-02-14

## 2025-02-14 LAB
DME PARACHUTE DELIVERY DATE REQUESTED: NORMAL
DME PARACHUTE ITEM DESCRIPTION: NORMAL
DME PARACHUTE ORDER STATUS: NORMAL
DME PARACHUTE SUPPLIER NAME: NORMAL
DME PARACHUTE SUPPLIER PHONE: NORMAL

## 2025-02-19 LAB

## 2025-02-25 ENCOUNTER — TELEPHONE (OUTPATIENT)
Dept: SLEEP CENTER | Facility: CLINIC | Age: 40
End: 2025-02-25

## 2025-02-25 LAB

## 2025-02-25 NOTE — TELEPHONE ENCOUNTER
Pt was set up on CPAP ResMed S11    Pressure:5-20cm  Mask: AirTouch F20 (L)    Sn#[id number]  Dn#[id number]

## 2025-05-09 ENCOUNTER — OFFICE VISIT (OUTPATIENT)
Dept: SLEEP CENTER | Facility: CLINIC | Age: 40
End: 2025-05-09
Payer: COMMERCIAL

## 2025-05-09 VITALS
HEIGHT: 69 IN | DIASTOLIC BLOOD PRESSURE: 86 MMHG | HEART RATE: 81 BPM | SYSTOLIC BLOOD PRESSURE: 128 MMHG | BODY MASS INDEX: 31.7 KG/M2 | OXYGEN SATURATION: 95 % | WEIGHT: 214 LBS

## 2025-05-09 DIAGNOSIS — G47.33 OSA (OBSTRUCTIVE SLEEP APNEA): Primary | ICD-10-CM

## 2025-05-09 DIAGNOSIS — I10 HYPERTENSION, ESSENTIAL: ICD-10-CM

## 2025-05-09 DIAGNOSIS — F31.9 BIPOLAR 1 DISORDER (HCC): ICD-10-CM

## 2025-05-09 PROCEDURE — 99214 OFFICE O/P EST MOD 30 MIN: CPT | Performed by: STUDENT IN AN ORGANIZED HEALTH CARE EDUCATION/TRAINING PROGRAM

## 2025-05-09 RX ORDER — LISDEXAMFETAMINE DIMESYLATE 70 MG/1
CAPSULE ORAL
COMMUNITY
Start: 2025-05-08

## 2025-05-09 NOTE — PATIENT INSTRUCTIONS
"Patient Education     Sleep apnea in adults   The Basics   Written by the doctors and editors at [facility]   What is sleep apnea? -- Sleep apnea is a condition that makes you stop breathing for short periods while you are asleep. There are 2 types of sleep apnea. One is called \"obstructive sleep apnea.\" The other is called \"central sleep apnea.\"  In obstructive sleep apnea, you stop breathing because your throat narrows or closes (figure 1). In central sleep apnea, you stop breathing because your brain does not send the right signals to your muscles to make you breathe. When people talk about sleep apnea, they are usually referring to obstructive sleep apnea, which is what this article is about.  People with sleep apnea do not know that they stop breathing when they are asleep. But they do sometimes wake up startled or gasping for breath. They also often hear from loved ones that they snore.  What are the symptoms of sleep apnea? -- The main symptoms of sleep apnea are loud snoring, tiredness, and daytime sleepiness. Other symptoms can include:   Restless sleep   Waking up choking or gasping   Morning headaches, dry mouth, or sore throat   Waking up often to urinate   Waking up feeling unrested or groggy   Trouble thinking clearly or remembering things  Some people with sleep apnea don't have symptoms, or don't realize that they have them.  Should I see a doctor or nurse? -- Yes. If you think that you might have sleep apnea, see your doctor.  Is there a test for sleep apnea? -- Yes. First, your doctor or nurse will ask about your symptoms. If you have a bed partner, they might also ask that person if you snore or gasp in your sleep. If the doctor or nurse suspects that you have sleep apnea, they might send you for a \"sleep study.\"  Sleep studies can sometimes be done at home, but they are usually done in a sleep lab. For the study, you spend the night in the lab, and you are hooked up to different machines that " "monitor your heart rate, breathing, and other body functions. The results of the test tell your doctor or nurse if you have the disorder.  Is there anything I can do on my own to help my sleep apnea? -- Yes. Some things that might help:   Try to avoid sleeping on your back, if possible. This might help some people.   Lose weight, if you have excess body weight.   Get regular physical activity. This might help you lose weight. But even if it doesn't, being active is good for your health.   Avoid alcohol, especially in the evening. Alcohol can make sleep apnea worse.  How is sleep apnea treated? -- Treatment can include:   Weight loss - As mentioned above, weight loss can help if you have excess weight or obesity. But losing weight can be challenging, and it takes time to lose enough weight to help with your sleep apnea. Most people need other treatment while they work on losing weight.   CPAP - The most effective treatment for sleep apnea is a device that keeps your airway open while you sleep. Treatment with this device is called \"continuous positive airway pressure\" (\"CPAP\"). People getting CPAP wear a face mask at night that keeps them breathing (figure 2).  If your doctor or nurse recommends a CPAP machine, be patient about using it. The mask might seem uncomfortable to wear at first, and the machine might seem noisy, but using the machine can really help you. People with sleep apnea who use a CPAP machine feel more rested and generally feel better.  If CPAP does not work, your doctor might suggest other treatment. Options might include:   An oral device - This is a device that you wear in your mouth. It is called an \"oral appliance\" or \"mandibular advancement device.\" It helps keep your airway open while you sleep.   Hypoglossal nerve stimulation - This involves a procedure to implant a small device into your chest. The device has a wire that connects to the nerve under your tongue. While you are sleeping, it " sends an electrical signal that causes the tongue to push forward. This helps open up your airway.   Surgery to widen your airway - This might involve removing your tonsils or other tissue that blocks the airway.  Is sleep apnea dangerous? -- It can be. Risks include:   Accidents - People with sleep apnea do not get good-quality sleep, so they are often tired and not alert. This puts them at risk for car accidents and other types of accidents.   Other health problems - Studies show that people with sleep apnea are more likely than others to have high blood pressure, heart attacks, and other serious heart problems. Some people also have mood changes or depression.  In people with severe sleep apnea, getting treated (for example, with CPAP) can help lower these risks.  All topics are updated as new evidence becomes available and our peer review process is complete.  This topic retrieved from [employer] on: Feb 28, 2024.  Topic 65718 Version 18.0  Release: 32.2.4 - C32.58  © 2024 UpToDate, Inc. and/or its affiliates. All rights reserved.  figure 1: Airway in a person with sleep apnea     Normally, when a person sleeps, the airway remains open, and air can pass from the nose and mouth to the lungs. In a person with sleep apnea, parts of the throat and mouth drop into the airway and block off the flow of air. This can cause loud snoring and interrupt breathing for short periods.  Graphic 16865 Version 6.0  figure 2: Continuous positive airway pressure (CPAP) for sleep apnea     The CPAP mask gently blows air into your nose while you sleep. It puts just enough pressure on your airway to keep it from closing. The mask in this picture fits over just the nose. Other CPAP devices have masks that fit over the nose and mouth.  Graphic 86953 Version 5.0  Consumer Information Use and Disclaimer   Disclaimer: This generalized information is a limited summary of diagnosis, treatment, and/or medication information. It is not meant to  be comprehensive and should be used as a tool to help the user understand and/or assess potential diagnostic and treatment options. It does NOT include all information about conditions, treatments, medications, side effects, or risks that may apply to a specific patient. It is not intended to be medical advice or a substitute for the medical advice, diagnosis, or treatment of a health care provider based on the health care provider's examination and assessment of a patient's specific and unique circumstances. Patients must speak with a health care provider for complete information about their health, medical questions, and treatment options, including any risks or benefits regarding use of medications. This information does not endorse any treatments or medications as safe, effective, or approved for treating a specific patient. UpToDate, Inc. and its affiliates disclaim any warranty or liability relating to this information or the use thereof.The use of this information is governed by the Terms of Use, available at https://www.woltersMcAfeeuwer.com/en/know/clinical-effectiveness-terms. 2024© UpToDate, Inc. and its affiliates and/or licensors. All rights reserved.  Copyright   © 2024 UpToDate, Inc. and/or its affiliates. All rights reserved.

## 2025-05-09 NOTE — ASSESSMENT & PLAN NOTE
Severe Obstructive Sleep Apnea - Discussed pathophysiology of MILENA, consequences of untreated MILENA and treatment options including PAP therapy. - Discussed risks of sleepy driving and mitigation strategies (napping). Patient agrees to not drive if tired or sleepy. - Advised avoidance of alcohol and centrally acting medications as these can worsen MILENA.  - Josh presents today for follow-up of severe obstructive sleep apnea on auto CPAP therapy.  Today is his initial compliance visit.  He has excellent regular use of CPAP.  We reviewed and discussed strategies to improve compliance today in the office.  He has a well-controlled residual AHI.  CPAP remains medically necessary to treat his sleep disordered breathing.  - He has a slightly elevated leak and I have asked him to attend mask clinic today in the office.  He currently uses a fullface mask interface.  It is notable that he has facial hair.  I have also ordered a pressure change adjustment to minimize and limit his pressure range to 5-10 cm of water.  -I have asked him to continue CPAP with all periods of sleep including naps.  I have asked him return to the office within 6 to 12 weeks for close clinical follow-up.  Patient verbalized understanding and stated that he would do so.  Orders:    PAP DME Pressure Change    PAP DME Resupply/Reorder

## 2025-05-09 NOTE — PROGRESS NOTES
Name: Josh Mars      : 1985      MRN: [id number]  Encounter Provider: John Mckeon MD  Encounter Date: 2025   Encounter department: [facility]IC[facility] [place]    :  Assessment & Plan  MILENA (obstructive sleep apnea)  Severe Obstructive Sleep Apnea - Discussed pathophysiology of MILENA, consequences of untreated MILENA and treatment options including PAP therapy. - Discussed risks of sleepy driving and mitigation strategies (napping). Patient agrees to not drive if tired or sleepy. - Advised avoidance of alcohol and centrally acting medications as these can worsen MILENA.  - Josh presents today for follow-up of severe obstructive sleep apnea on auto CPAP therapy.  Today is his initial compliance visit.  He has excellent regular use of CPAP.  We reviewed and discussed strategies to improve compliance today in the office.  He has a well-controlled residual AHI.  CPAP remains medically necessary to treat his sleep disordered breathing.  - He has a slightly elevated leak and I have asked him to attend mask clinic today in the office.  He currently uses a fullface mask interface.  It is notable that he has facial hair.  I have also ordered a pressure change adjustment to minimize and limit his pressure range to 5-10 cm of water.  -I have asked him to continue CPAP with all periods of sleep including naps.  I have asked him return to the office within 6 to 12 weeks for close clinical follow-up.  Patient verbalized understanding and stated that he would do so.  Orders:    PAP DME Pressure Change    PAP DME Resupply/Reorder    Hypertension, essential  Hypertension - We reviewed the association between untreated obstructive sleep apnea and the increased risk for hypertension. Patient to continue on prescribed anti-hypertensive therapy and follow up with PCP for continuity of care.          Bipolar 1 disorder (HCC)  We reviewed the association between sleep, mood, and coexisting psychiatric  "disorders. We discussed the importance of obtaining adequate sleep of at least 7 hours nightly. Patient was encouraged to continue current prescribed medications and to continue follow up with their PCP/psychiatrist for further management.            History of Present Illness       Pertinent positives/negatives included in HPI and also as noted:     Objective   Ht 5' 9\" (1.753 m)   Wt 97.1 kg (214 lb)   BMI 31.60 kg/m²        [facility]  Follow-up Visit    Patient Name: Josh Mars  Date of Service: 05/09/25  Date of Last Visit: 2/25/2025      PATIENT PROFILE  Mr. Josh Mars is a 39 y.o. male with past medical history significant for:  Severe obstructive sleep apnea, bipolar disorder, hypertension.    I have reviewed the 2825 neurosurgery office note with Laura Joshua PA-C.    I have reviewed the 6/21/2021 urology office note with TAMARA Archuleta.     Interval History:  Josh presents today for his initial CPAP compliance visit.    CPAP remains medically necessary to treat his sleep disordered breathing.  He has regular use of CPAP.  We reviewed strategies to improve his compliance and regular use.  He has a well-controlled residual AHI.    He has a slightly elevated mask leak.  I have asked him to attend mask clinic today in the office.  I have also reduced his pressure range from 9-20 cm of water down to 5-10 cm of water.    I have asked him to continue CPAP use with all periods of sleep including naps.  I have reminded him to not drive if sleepy.    I have asked him return to the office within 6 to 12 weeks for close clinical follow-up.  Patient verbalized understanding and stated that he would do so.    ESS today is 4/24 (normal is < 10).     Sleep-Wake Schedule:   Bedtime: 10 PM  Time to fall alseep: Brief, less than 30 minutes  Waketime: 4:30 AM  Total Sleep Time: Approximately 5-6 hours  Naps: Previously 1-2 hours after work      CPAP Compliance  DME: [employer]  Machine " Type and Settings: AutoCPAP 5-20 cmH2O   Mask Type: FFM Interface  Compliance Reviewed (4/7/25-5/6/25)  77% Usage (23 out of 30 days).   Used > 4 hours a Night For 57% of nights.  AutoCPAP Median Pressure -  6.0 cmH2O, 95% Pressure - 8.1 cmH20   Median Leak - 29.3 L/min, 95% Leak - 49.6 L/min  Residual AHI 4.4 events/hour    He reports the following problems with the mask/CPAP:   Mask leaks   Skin irritation or breakdown     Frequent mask adjustment   Eye irritation     Difficulty tolerating air pressure   Nose bleeds     Generalized discomfort   Noisy     Difficulty applying or adjusting   Condensation in mask/tubing     Causes frequent awakenings   Complaints from bed partner     Pressure on nasal bridge or nostrils   Dry mouth     Wrong size   Dry nose     Nasal congestion   Using humidifier: Y or N     Other:   Other:      He reports the following improvements in his sleep-related symptoms on PAP therapy:   X Decreased/abolished snoring  X Improving - More refreshed in AM     Decreased gasping/choking   Decreased/abolished AM headaches     Decreased nocturia   Decreased daytime sleepiness     Improved sleep quality   Decreased napping/dozing     Decreased awakenings  X Increased energy     Decreased restlessness  X Improved alertness     Dry mouth improved  X Improved mood     Improved nasal congestion/allergies   Improved memory/concentration     Improved GERD   Other:     Improved blood pressure   Other:      Patient's, past medical, surgical, social and family histories were reviewed and updated as appropriate.    No Known Allergies    Current Outpatient Medications on File Prior to Visit   Medication Sig    atenolol (TENORMIN) 25 mg tablet Take 25 mg by mouth daily    atomoxetine (STRATTERA) 80 MG capsule Take 80 mg by mouth    Auvelity  MG TBCR Take 1 tablet by mouth 2 (two) times a day    lamoTRIgine (LaMICtal) 100 mg tablet     lisinopril (ZESTRIL) 20 mg tablet Take 1 tablet by mouth in the morning  "   montelukast (SINGULAIR) 10 mg tablet Take 10 mg by mouth    naltrexone (REVIA) 50 mg tablet     omeprazole (PriLOSEC) 40 MG capsule Take 40 mg by mouth daily    rosuvastatin (CRESTOR) 10 MG tablet Take 10 mg by mouth daily    tadalafil (CIALIS) 5 MG tablet     traZODone (DESYREL) 50 mg tablet     Vraylar 4.5 MG capsule Take 1 capsule by mouth daily    Vyvanse 70 MG capsule      No current facility-administered medications on file prior to visit.       Physical Examination:  Gen: No acute distress, not visibly anxious, speaking comfortably  H&N: MM III; no retro/micrognathia; no macroglossia; no visible thyromegaly  Neuro: Alert and oriented x3, interactive  Psych: Pleasant, normal affect  Skin: No visible rashes  Respiratory: No accessory muscle use, breathing comfortably  Cardiac: No visible edema of extremities  Abdomen: Soft, nontender, nondistended  Musculoskeletal: Normal ROM of extremities       Lab Results   Component Value Date    SODIUM 140 01/16/2025    K 4 01/16/2025    CL 99 (L) 01/16/2025    CO2 30 01/16/2025    BUN 17 01/16/2025    CREATININE 1.12 01/16/2025    GLUC 121 (H) 01/16/2025    CALCIUM 9.9 01/16/2025       No results found for: \"WBC\", \"HGB\", \"HCT\", \"MCV\", \"PLT\"    Lab Results   Component Value Date    TSH 0.84 10/05/2024       Lab Results   Component Value Date    FERRITIN 161 06/11/2020     Recent Weights: Body mass index is 31.6 kg/m².    Results/Data:  I have reviewed the results and report from the 5/7/2024 chest x-ray.    Independent Findings Reviewed: Normal chest.    I have reviewed the results and report from the 2/26/2025 cervical spine x-ray.    Independent Findings Reviewed:  Interdisc spaces are seen at the level C5-C6.    Follow-up will be in 6-12 weeks. I encouraged him to contact me with any questions, problems or concerns in the interim.  We will continue with longitudinal follow-up for management of sleep disordered breathing.    John Mckeon MD  Sleep Medicine and " "Internal Medicine  [facility]  05/09/25      Portions of the record may have been created with voice recognition software.  Occasional wrong word or \"sound a like\" substitutions may have occurred due to the inherent limitations of voice recognition software.  Read the chart carefully and recognize, using context, where substitutions have occurred.       "

## 2025-05-12 ENCOUNTER — TELEPHONE (OUTPATIENT)
Dept: SLEEP CENTER | Facility: CLINIC | Age: 40
End: 2025-05-12
